# Patient Record
Sex: FEMALE | Race: WHITE | NOT HISPANIC OR LATINO | URBAN - NONMETROPOLITAN AREA
[De-identification: names, ages, dates, MRNs, and addresses within clinical notes are randomized per-mention and may not be internally consistent; named-entity substitution may affect disease eponyms.]

---

## 2020-06-12 ENCOUNTER — IMPORTED ENCOUNTER (OUTPATIENT)
Dept: URBAN - NONMETROPOLITAN AREA CLINIC 1 | Facility: CLINIC | Age: 45
End: 2020-06-12

## 2020-06-12 PROBLEM — H52.223: Noted: 2020-06-12

## 2020-06-12 PROBLEM — H52.13: Noted: 2020-06-12

## 2020-06-12 PROBLEM — H25.013: Noted: 2020-06-12

## 2020-06-12 PROCEDURE — S0620 ROUTINE OPHTHALMOLOGICAL EXA: HCPCS

## 2020-06-12 NOTE — PATIENT DISCUSSION
Myopia / Astigmatism / Presbyopia OU - Discussed diagnosis in detail with patient- New glasses RX given today MR done by Dr. Cooley Marcio - Continue to monitor- RTC 1 year complete Cortical cataracts OU- Discussed diagnosis in detail with patient- Discussed signs and symptoms of progression- Discussed UV protection- No treatment needed at this time - Continue to monitor

## 2022-04-09 ASSESSMENT — VISUAL ACUITY
OS_SC: 20/30-1
OD_SC: 20/20

## 2022-04-09 ASSESSMENT — TONOMETRY
OD_IOP_MMHG: 10
OS_IOP_MMHG: 12